# Patient Record
(demographics unavailable — no encounter records)

---

## 2025-07-21 NOTE — PHYSICAL EXAM
[Well Developed] : well developed [Well Nourished] : well nourished [No Acute Distress] : no acute distress [Normal Conjunctiva] : normal conjunctiva [Normal Venous Pressure] : normal venous pressure [No Carotid Bruit] : no carotid bruit [Normal S1, S2] : normal S1, S2 [No Murmur] : no murmur [No Rub] : no rub [No Gallop] : no gallop [Clear Lung Fields] : clear lung fields [Good Air Entry] : good air entry [No Respiratory Distress] : no respiratory distress  [Soft] : abdomen soft [Non Tender] : non-tender [Normal Bowel Sounds] : normal bowel sounds [Normal Gait] : normal gait [No Edema] : no edema [No Cyanosis] : no cyanosis [No Clubbing] : no clubbing [No Rash] : no rash [Moves all extremities] : moves all extremities [No Focal Deficits] : no focal deficits [Normal Speech] : normal speech [Alert and Oriented] : alert and oriented [Normal memory] : normal memory

## 2025-07-22 NOTE — HISTORY OF PRESENT ILLNESS
[FreeTextEntry1] : 66 yo female with family history of premature CAD, mild coronary artery calcification (4/2024 LAD/whole heart Agatston score 8) and elevated HDL.  She is active walking several miles, playing tennis and Soul cycle 45 minutes.  These activities are well-tolerated without chest discomfort.  She has shortness of breath with some activities unchanged for many years. Cardiovascular evaluation 3/19/2021 included echocardiogram and stress test to 11 METS that were normal.   She denies orthopnea, PND, palpitations, lightheadedness, syncope, presyncope and peripheral edema.  Preference is to control lipids with diet and lifestyle rather than medication. Diet is healthy. She drinks 1 alcoholic drink a day.  She does not smoke.  Laboratory  1/5/2023 Hgb A1c 5.3, eGFR 105, , triglycerides 74, HDL 99, LDL 97, TSH 1.68. 4/19/2024 , LDL 88, HDL 94, triglycerides 50,  hs CRP 0.12 7/14/25 , LDL 87, HDL 79, trig 45, A1C 5.3, eGFR 105, CRP 0.12

## 2025-07-22 NOTE — CARDIOLOGY SUMMARY
[de-identified] : 4/9/2024    atrial Rhythm -occasional ectopic ventricular beat   P:QRS - 1:1, Superior P axis,  H Rate 63 BORDERLINE RHYTHM

## 2025-07-22 NOTE — DISCUSSION/SUMMARY
[FreeTextEntry1] : 1.  Maintain healthy lifestyle, we reviewed the importance of diet and exercise. 2. We reviewed recent labs and trend. 3.  Her preference is to control lipids with diet and lifestyle rather than medication.   4.  Scheduled cardiology follow-up with repeat lipids, hs-CRP will be in 1 year or sooner prn.

## 2025-07-22 NOTE — CARDIOLOGY SUMMARY
[de-identified] : 4/9/2024    atrial Rhythm -occasional ectopic ventricular beat   P:QRS - 1:1, Superior P axis,  H Rate 63 BORDERLINE RHYTHM

## 2025-07-22 NOTE — REASON FOR VISIT
[CV Risk Factors and Non-Cardiac Disease] : CV risk factors and non-cardiac disease [FreeTextEntry1] : F/u lipid profile and CRP 3 months after modifying diet

## 2025-07-22 NOTE — ASSESSMENT
[FreeTextEntry1] : 66 yo  woman with family history of premature ASCVD, mild coronary artery calcification4/23/24  (whole heart Agatston score = 8) and elevated HDL. Previous 2021 echocardiogram and stress test to 11 METS were normal. She is physically active, exercising on a regular basis without cardiac symptoms.  Lipids have improved with dietary modification 7/14/25 total cholesterol 175, LDL 87 and HDL 79 and are certainly acceptable..

## 2025-07-22 NOTE — ASSESSMENT
[FreeTextEntry1] : 68 yo  woman with family history of premature ASCVD, mild coronary artery calcification4/23/24  (whole heart Agatston score = 8) and elevated HDL. Previous 2021 echocardiogram and stress test to 11 METS were normal. She is physically active, exercising on a regular basis without cardiac symptoms.  Lipids have improved with dietary modification 7/14/25 total cholesterol 175, LDL 87 and HDL 79 and are certainly acceptable..